# Patient Record
Sex: MALE | Race: WHITE | NOT HISPANIC OR LATINO | Employment: FULL TIME | URBAN - NONMETROPOLITAN AREA
[De-identification: names, ages, dates, MRNs, and addresses within clinical notes are randomized per-mention and may not be internally consistent; named-entity substitution may affect disease eponyms.]

---

## 2020-01-20 ENCOUNTER — HOSPITAL ENCOUNTER (EMERGENCY)
Facility: HOSPITAL | Age: 50
Discharge: LEFT BEFORE DISCHARGE | End: 2020-01-20

## 2020-01-20 VITALS
BODY MASS INDEX: 23.1 KG/M2 | WEIGHT: 180 LBS | TEMPERATURE: 98 F | OXYGEN SATURATION: 100 % | RESPIRATION RATE: 18 BRPM | SYSTOLIC BLOOD PRESSURE: 112 MMHG | HEART RATE: 77 BPM | DIASTOLIC BLOOD PRESSURE: 45 MMHG | HEIGHT: 74 IN

## 2020-01-20 LAB
FLUAV AG NPH QL: NEGATIVE
FLUBV AG NPH QL IA: NEGATIVE

## 2020-01-20 PROCEDURE — 87804 INFLUENZA ASSAY W/OPTIC: CPT | Performed by: FAMILY MEDICINE

## 2020-01-21 NOTE — ED NOTES
Pt called to be roomed at this time, pt unable to be located in ED lobby or outside of ED waiting area.     Genna Arora RN  01/20/20 1957

## 2022-03-24 ENCOUNTER — TRANSCRIBE ORDERS (OUTPATIENT)
Dept: ADMINISTRATIVE | Facility: HOSPITAL | Age: 52
End: 2022-03-24

## 2022-03-24 DIAGNOSIS — R63.4 LOSS OF WEIGHT: Primary | ICD-10-CM

## 2022-04-01 ENCOUNTER — HOSPITAL ENCOUNTER (OUTPATIENT)
Dept: PET IMAGING | Facility: HOSPITAL | Age: 52
Discharge: HOME | End: 2022-04-01
Admitting: INTERNAL MEDICINE

## 2022-04-01 DIAGNOSIS — R63.4 LOSS OF WEIGHT: ICD-10-CM

## 2022-04-01 PROCEDURE — A9552 F18 FDG: HCPCS | Performed by: INTERNAL MEDICINE

## 2022-04-01 PROCEDURE — 78815 PET IMAGE W/CT SKULL-THIGH: CPT | Performed by: RADIOLOGY

## 2022-04-01 PROCEDURE — 0 FLUDEOXYGLUCOSE F18 SOLUTION: Performed by: INTERNAL MEDICINE

## 2022-04-01 PROCEDURE — 78815 PET IMAGE W/CT SKULL-THIGH: CPT

## 2022-04-01 RX ADMIN — FLUDEOXYGLUCOSE F18 1 DOSE: 300 INJECTION INTRAVENOUS at 13:20

## 2025-03-24 ENCOUNTER — OFFICE VISIT (OUTPATIENT)
Dept: FAMILY MEDICINE CLINIC | Facility: CLINIC | Age: 55
End: 2025-03-24
Payer: COMMERCIAL

## 2025-03-24 VITALS
DIASTOLIC BLOOD PRESSURE: 80 MMHG | HEART RATE: 87 BPM | OXYGEN SATURATION: 98 % | SYSTOLIC BLOOD PRESSURE: 124 MMHG | HEIGHT: 74 IN | BODY MASS INDEX: 22.15 KG/M2 | WEIGHT: 172.6 LBS | TEMPERATURE: 98.7 F

## 2025-03-24 DIAGNOSIS — F17.210 CIGARETTE NICOTINE DEPENDENCE WITHOUT COMPLICATION: ICD-10-CM

## 2025-03-24 DIAGNOSIS — J44.1 COPD WITH EXACERBATION: Primary | ICD-10-CM

## 2025-03-24 PROCEDURE — 99204 OFFICE O/P NEW MOD 45 MIN: CPT | Performed by: FAMILY MEDICINE

## 2025-03-24 PROCEDURE — 1126F AMNT PAIN NOTED NONE PRSNT: CPT | Performed by: FAMILY MEDICINE

## 2025-03-24 PROCEDURE — 1160F RVW MEDS BY RX/DR IN RCRD: CPT | Performed by: FAMILY MEDICINE

## 2025-03-24 PROCEDURE — 1159F MED LIST DOCD IN RCRD: CPT | Performed by: FAMILY MEDICINE

## 2025-03-24 RX ORDER — FLUTICASONE FUROATE AND VILANTEROL 100; 25 UG/1; UG/1
1 POWDER RESPIRATORY (INHALATION)
COMMUNITY
End: 2025-03-24 | Stop reason: SDUPTHER

## 2025-03-24 RX ORDER — FLUTICASONE FUROATE AND VILANTEROL 100; 25 UG/1; UG/1
1 POWDER RESPIRATORY (INHALATION)
Qty: 60 EACH | Refills: 2 | Status: SHIPPED | OUTPATIENT
Start: 2025-03-24

## 2025-03-24 RX ORDER — DOXYCYCLINE 100 MG/1
100 CAPSULE ORAL 2 TIMES DAILY
Qty: 14 CAPSULE | Refills: 0 | Status: SHIPPED | OUTPATIENT
Start: 2025-03-24

## 2025-03-24 RX ORDER — BUPROPION HYDROCHLORIDE 150 MG/1
150 TABLET ORAL DAILY
Qty: 30 TABLET | Refills: 2 | Status: SHIPPED | OUTPATIENT
Start: 2025-03-24

## 2025-03-24 RX ORDER — PREDNISONE 20 MG/1
20 TABLET ORAL DAILY
Qty: 5 TABLET | Refills: 0 | Status: SHIPPED | OUTPATIENT
Start: 2025-03-24

## 2025-03-24 NOTE — PROGRESS NOTES
Chief Complaint  Establish Care and Shortness of Breath    Subjective          Braden Rodriguez presents to [facility]  Shortness of Breath      History of Present Illness  The patient is a 55-year-old male who presents to establish care.    He has a history of chronic obstructive pulmonary disease (COPD), previously managed by Dr. Cochran. He was prescribed Symbicort, which proved ineffective, and subsequently Trelegy, which he found beneficial. He uses the inhaler as needed, approximately twice weekly. He reports experiencing wheezing when in a supine position, a symptom he had not previously encountered. He has not had any consultations with a pulmonologist. His last laboratory tests were conducted three years ago. He is seeking a refill of his Breo prescription. He also expresses interest in smoking cessation strategies. He has reduced his smoking to one pack per day but does not anticipate further reduction. Approximately ten years ago, he was prescribed Wellbutrin by Dr. Cochran, which enabled him to quit smoking for seven years. However, he resumed smoking due to personal circumstances. He underwent lung cancer screening six years ago, which revealed fluid accumulation in his lungs, necessitating drainage of 2.5 bags. He has been smoking since 1986, with a peak consumption of 2.5 to 3 packs per day, now reduced to one pack per day. He recalls a severe coughing episode in 2013, during which his oxygen saturation dropped to 45 percent. He was advised to rest at home and seek immediate medical attention if his condition worsened. He was prescribed Tessalon for cough management at an urgent care facility, but it did not alleviate his symptoms.    He had a colonoscopy within the last year at [facility].    SOCIAL HISTORY  The patient admits to smoking since 1986 and is currently down to a pack a day.    MEDICATIONS  Current: Trelegy, Breo  Past: Symbicort, Wellbutrin, Tessalon    Review of  "Systems   Respiratory:  Positive for shortness of breath.          Objective   Vital Signs:   /80 (BP Location: Right arm, Patient Position: Sitting, Cuff Size: Large Adult)   Pulse 87   Temp 98.7 °F (37.1 °C) (Temporal)   Ht 188 cm (74\")   Wt 78.3 kg (172 lb 9.6 oz)   SpO2 98%   BMI 22.16 kg/m²     Physical Exam  Lungs were auscultated.    Physical Exam  Constitutional:       General: He is not in acute distress.     Appearance: Normal appearance. He is well-developed and well-groomed. He is not ill-appearing, toxic-appearing or diaphoretic.   HENT:      Head: Normocephalic.      Right Ear: Tympanic membrane, ear canal and external ear normal.      Left Ear: Tympanic membrane, ear canal and external ear normal.      Nose: Nose normal. No congestion or rhinorrhea.      Mouth/Throat:      Mouth: Mucous membranes are moist.      Pharynx: Oropharynx is clear. No oropharyngeal exudate or posterior oropharyngeal erythema.   Eyes:      General: Lids are normal.         Right eye: No discharge.         Left eye: No discharge.      Extraocular Movements: Extraocular movements intact.      Pupils: Pupils are equal, round, and reactive to light.   Neck:      Vascular: No carotid bruit.   Cardiovascular:      Rate and Rhythm: Normal rate and regular rhythm.      Pulses: Normal pulses.      Heart sounds: Normal heart sounds. No murmur heard.     No friction rub. No gallop.   Pulmonary:      Effort: Pulmonary effort is normal. No respiratory distress.      Breath sounds: No stridor. Wheezing present. No rhonchi or rales.   Chest:      Chest wall: No tenderness.   Abdominal:      General: Bowel sounds are normal. There is no distension.      Palpations: Abdomen is soft. There is no mass.      Tenderness: There is no abdominal tenderness. There is no right CVA tenderness, left CVA tenderness, guarding or rebound.      Hernia: No hernia is present.   Musculoskeletal:         General: No swelling or tenderness. Normal " range of motion.      Cervical back: Normal range of motion and neck supple. No rigidity or tenderness.      Right lower leg: No edema.      Left lower leg: No edema.   Lymphadenopathy:      Cervical: No cervical adenopathy.   Skin:     General: Skin is warm.      Capillary Refill: Capillary refill takes less than 2 seconds.      Coloration: Skin is not jaundiced.      Findings: No bruising, erythema or rash.   Neurological:      General: No focal deficit present.      Mental Status: He is alert and oriented to person, place, and time.      Motor: Motor function is intact. No weakness.      Coordination: Coordination is intact.      Gait: Gait is intact. Gait normal.   Psychiatric:         Attention and Perception: Attention normal.         Mood and Affect: Mood normal.         Speech: Speech normal.         Behavior: Behavior normal.         Cognition and Memory: Cognition normal.         Judgment: Judgment normal.        Result Review :          Results                Assessment and Plan    Diagnoses and all orders for this visit:    1. COPD with exacerbation (Primary)  -     CBC Auto Differential; Future  -     Comprehensive Metabolic Panel; Future  -     Hemoglobin A1c; Future  -     Lipid Panel; Future  -     T4, Free; Future  -     TSH; Future  -     doxycycline (VIBRAMYCIN) 100 MG capsule; Take 1 capsule by mouth 2 (Two) Times a Day.  Dispense: 14 capsule; Refill: 0  -     predniSONE (DELTASONE) 20 MG tablet; Take 1 tablet by mouth Daily.  Dispense: 5 tablet; Refill: 0  -     Fluticasone Furoate-Vilanterol (Breo Ellipta) 100-25 MCG/ACT aerosol powder ; Inhale 1 puff Daily.  Dispense: 60 each; Refill: 2  -     XR Chest PA & Lateral (In Office)    2. Cigarette nicotine dependence without complication  -     CT Chest Low Dose Wo; Future  -     buPROPion XL (Wellbutrin XL) 150 MG 24 hr tablet; Take 1 tablet by mouth Daily.  Dispense: 30 tablet; Refill: 2      Assessment & Plan  1. Chronic Obstructive Pulmonary  Disease (COPD).  He reports that Trelegy has been effective in managing his COPD symptoms. He experiences wheezing when lying down and has a history of fluid on his lungs. A prescription for doxycycline and prednisone has been issued to manage his current symptoms. A chest x-ray will be conducted today to assess his lung condition. A refill for his Breo inhaler has been provided and sent to [facility] pharmacy. If symptoms persist, a referral to pulmonology will be considered.    2. Smoking Cessation.  He has expressed interest in quitting smoking and has previously used Wellbutrin successfully to quit for seven years. He is currently down to a pack a day but finds it challenging to reduce further. Discussed the importance of smoking cessation and potential strategies, including the use of medications like Wellbutrin again if needed.    3. Health Maintenance.  He had a colonoscopy within the last year at [facility]. Efforts will be made to obtain the results of his previous colonoscopy.    Follow-up  The patient will follow up in 2 weeks.    PROCEDURE  The patient underwent colonoscopy within the last year at [facility].    Patient's Body mass index is 22.16 kg/m². indicating that he is within normal range (BMI 18.5-24.9). No BMI management plan needed..    Follow Up   Return in about 2 weeks (around 4/7/2025), or xray today.  Patient was given instructions and counseling regarding his condition or for health maintenance advice. Please see specific information pulled into the AVS if appropriate.     This document has been electronically signed by PENELOPE Howard  March 24, 2025 16:05 EDT     Patient or patient representative verbalized consent for the use of Ambient Listening during the visit with  PENELOPE Howard for chart documentation. 3/24/2025  16:05 EDT

## 2025-03-26 ENCOUNTER — RESULTS FOLLOW-UP (OUTPATIENT)
Dept: FAMILY MEDICINE CLINIC | Facility: CLINIC | Age: 55
End: 2025-03-26
Payer: COMMERCIAL

## 2025-04-03 ENCOUNTER — TELEPHONE (OUTPATIENT)
Dept: FAMILY MEDICINE CLINIC | Facility: CLINIC | Age: 55
End: 2025-04-03
Payer: COMMERCIAL

## 2025-04-03 NOTE — TELEPHONE ENCOUNTER
04/03/2025 NINO Bess submitted and approved from 04/03/2025 to 04/03/2026. [facility] [phone] Bao Wood notified.

## 2025-04-08 ENCOUNTER — RESULTS FOLLOW-UP (OUTPATIENT)
Dept: FAMILY MEDICINE CLINIC | Facility: CLINIC | Age: 55
End: 2025-04-08

## 2025-04-08 ENCOUNTER — LAB (OUTPATIENT)
Dept: FAMILY MEDICINE CLINIC | Facility: CLINIC | Age: 55
End: 2025-04-08
Payer: COMMERCIAL

## 2025-04-08 ENCOUNTER — OFFICE VISIT (OUTPATIENT)
Dept: FAMILY MEDICINE CLINIC | Facility: CLINIC | Age: 55
End: 2025-04-08
Payer: COMMERCIAL

## 2025-04-08 VITALS
DIASTOLIC BLOOD PRESSURE: 80 MMHG | BODY MASS INDEX: 21.87 KG/M2 | HEART RATE: 57 BPM | HEIGHT: 74 IN | OXYGEN SATURATION: 95 % | WEIGHT: 170.4 LBS | SYSTOLIC BLOOD PRESSURE: 124 MMHG | TEMPERATURE: 97.7 F

## 2025-04-08 DIAGNOSIS — J44.1 COPD WITH EXACERBATION: ICD-10-CM

## 2025-04-08 DIAGNOSIS — B07.0 PLANTAR WART: ICD-10-CM

## 2025-04-08 DIAGNOSIS — J44.1 COPD WITH EXACERBATION: Primary | ICD-10-CM

## 2025-04-08 DIAGNOSIS — Z12.11 SCREEN FOR COLON CANCER: ICD-10-CM

## 2025-04-08 LAB
ALBUMIN SERPL-MCNC: 4.1 G/DL (ref 3.5–5.2)
ALBUMIN/GLOB SERPL: 1.4 G/DL
ALP SERPL-CCNC: 124 U/L (ref 39–117)
ALT SERPL W P-5'-P-CCNC: 16 U/L (ref 1–41)
ANION GAP SERPL CALCULATED.3IONS-SCNC: 8.5 MMOL/L (ref 5–15)
AST SERPL-CCNC: 21 U/L (ref 1–40)
BASOPHILS # BLD AUTO: 0.07 10*3/MM3 (ref 0–0.2)
BASOPHILS NFR BLD AUTO: 0.7 % (ref 0–1.5)
BILIRUB SERPL-MCNC: 0.6 MG/DL (ref 0–1.2)
BUN SERPL-MCNC: 8 MG/DL (ref 6–20)
BUN/CREAT SERPL: 10.8 (ref 7–25)
CALCIUM SPEC-SCNC: 9 MG/DL (ref 8.6–10.5)
CHLORIDE SERPL-SCNC: 105 MMOL/L (ref 98–107)
CHOLEST SERPL-MCNC: 160 MG/DL (ref 0–200)
CO2 SERPL-SCNC: 26.5 MMOL/L (ref 22–29)
CREAT SERPL-MCNC: 0.74 MG/DL (ref 0.76–1.27)
DEPRECATED RDW RBC AUTO: 40.4 FL (ref 37–54)
EGFRCR SERPLBLD CKD-EPI 2021: 106.3 ML/MIN/1.73
EOSINOPHIL # BLD AUTO: 0.29 10*3/MM3 (ref 0–0.4)
EOSINOPHIL NFR BLD AUTO: 2.8 % (ref 0.3–6.2)
ERYTHROCYTE [DISTWIDTH] IN BLOOD BY AUTOMATED COUNT: 12.6 % (ref 12.3–15.4)
GLOBULIN UR ELPH-MCNC: 2.9 GM/DL
GLUCOSE SERPL-MCNC: 94 MG/DL (ref 65–99)
HBA1C MFR BLD: 5.4 % (ref 4.8–5.6)
HCT VFR BLD AUTO: 50.4 % (ref 37.5–51)
HDLC SERPL-MCNC: 38 MG/DL (ref 40–60)
HGB BLD-MCNC: 16.3 G/DL (ref 13–17.7)
IMM GRANULOCYTES # BLD AUTO: 0.04 10*3/MM3 (ref 0–0.05)
IMM GRANULOCYTES NFR BLD AUTO: 0.4 % (ref 0–0.5)
LDLC SERPL CALC-MCNC: 105 MG/DL (ref 0–100)
LDLC/HDLC SERPL: 2.74 {RATIO}
LYMPHOCYTES # BLD AUTO: 1.46 10*3/MM3 (ref 0.7–3.1)
LYMPHOCYTES NFR BLD AUTO: 14.3 % (ref 19.6–45.3)
MCH RBC QN AUTO: 28.7 PG (ref 26.6–33)
MCHC RBC AUTO-ENTMCNC: 32.3 G/DL (ref 31.5–35.7)
MCV RBC AUTO: 88.7 FL (ref 79–97)
MONOCYTES # BLD AUTO: 0.81 10*3/MM3 (ref 0.1–0.9)
MONOCYTES NFR BLD AUTO: 7.9 % (ref 5–12)
NEUTROPHILS NFR BLD AUTO: 7.57 10*3/MM3 (ref 1.7–7)
NEUTROPHILS NFR BLD AUTO: 73.9 % (ref 42.7–76)
NRBC BLD AUTO-RTO: 0 /100 WBC (ref 0–0.2)
PLATELET # BLD AUTO: 240 10*3/MM3 (ref 140–450)
PMV BLD AUTO: 9.8 FL (ref 6–12)
POTASSIUM SERPL-SCNC: 3.7 MMOL/L (ref 3.5–5.2)
PROT SERPL-MCNC: 7 G/DL (ref 6–8.5)
RBC # BLD AUTO: 5.68 10*6/MM3 (ref 4.14–5.8)
SODIUM SERPL-SCNC: 140 MMOL/L (ref 136–145)
T4 FREE SERPL-MCNC: 1.3 NG/DL (ref 0.92–1.68)
TRIGL SERPL-MCNC: 90 MG/DL (ref 0–150)
TSH SERPL DL<=0.05 MIU/L-ACNC: 1.45 UIU/ML (ref 0.27–4.2)
VLDLC SERPL-MCNC: 17 MG/DL (ref 5–40)
WBC NRBC COR # BLD AUTO: 10.24 10*3/MM3 (ref 3.4–10.8)

## 2025-04-08 PROCEDURE — 36415 COLL VENOUS BLD VENIPUNCTURE: CPT

## 2025-04-08 PROCEDURE — 80050 GENERAL HEALTH PANEL: CPT | Performed by: FAMILY MEDICINE

## 2025-04-08 PROCEDURE — 80061 LIPID PANEL: CPT | Performed by: FAMILY MEDICINE

## 2025-04-08 PROCEDURE — 84439 ASSAY OF FREE THYROXINE: CPT | Performed by: FAMILY MEDICINE

## 2025-04-08 PROCEDURE — 83036 HEMOGLOBIN GLYCOSYLATED A1C: CPT | Performed by: FAMILY MEDICINE

## 2025-04-08 RX ORDER — NICOTINE 21-14-7MG
1 KIT TRANSDERMAL DAILY
Qty: 70 EACH | Refills: 0 | Status: SHIPPED | OUTPATIENT
Start: 2025-04-08

## 2025-04-08 RX ORDER — TRAZODONE HYDROCHLORIDE 50 MG/1
50 TABLET ORAL NIGHTLY
Qty: 90 TABLET | Refills: 0 | Status: SHIPPED | OUTPATIENT
Start: 2025-04-08

## 2025-04-08 RX ORDER — FLUTICASONE FUROATE, UMECLIDINIUM BROMIDE AND VILANTEROL TRIFENATATE 200; 62.5; 25 UG/1; UG/1; UG/1
1 POWDER RESPIRATORY (INHALATION)
Qty: 60 EACH | Refills: 2 | Status: SHIPPED | OUTPATIENT
Start: 2025-04-08

## 2025-04-08 NOTE — PROGRESS NOTES
Chief Complaint  COPD    Subjective          Braden Rodriguez presents to [facility]  COPD      History of Present Illness  The patient is a 55-year-old male who presents for follow-up on inhalers, nicotine dependence, sleep disturbance, and foot corn.    He reports a slight improvement in his cough, which has been a persistent issue for several years. He has not yet undergone the scheduled CT scan. He is currently using Trelegy 200 mcg inhaler.    He expresses interest in nicotine patches as an aid to quit smoking. His smoking habit has reduced to approximately three-quarters of a pack per day.    He has been experiencing sleep disturbances, often not falling asleep until 6:00 or 7:00 AM after returning home from work at 1:00 AM. He has not tried trazodone or any other sleep aids due to concerns about potential addiction. He has attempted to manage this with ZzzQuil but reports no improvement and is hesitant to increase the dosage. He recalls a previous experience with a different sleep aid, which initially provided 8 to 9 hours of sleep but gradually became less effective, reducing his sleep duration to 3 hours.    He suspects the presence of a corn on his foot, which he attempts to manage by peeling off the top layer after showers. However, the corn reappears the following day. He experiences pain in his feet while lying in bed, which is exacerbated by a pre-existing limp in his left leg. He also reports a high arch in his foot. He has previously used Dr. Addison's insoles and trims his toenails every 3 to 4 days. He has a history of foot problems since childhood and is unable to walk barefoot.    He has not had a colonoscopy in over 10 years. He prefers to do Cologuard test at home.    SOCIAL HISTORY  The patient is down to about three-quarters of a pack of cigarettes a day.    FAMILY HISTORY  The patient reports that basically everyone on his mother's side had  of some type of  "cancer. His father's side has had some kind of heart disease.    MEDICATIONS  Current: Trelegy inhaler, ZzzQuil    Review of Systems      Objective   Vital Signs:   /80 (BP Location: Right arm, Patient Position: Sitting, Cuff Size: Large Adult)   Pulse 57   Temp 97.7 °F (36.5 °C) (Temporal)   Ht 188 cm (74\")   Wt 77.3 kg (170 lb 6.4 oz)   SpO2 95%   BMI 21.88 kg/m²     Physical Exam  Lungs sound coarse, but improved. No wheezing.    Physical Exam  Constitutional:       General: He is not in acute distress.     Appearance: Normal appearance. He is well-developed and well-groomed. He is not ill-appearing, toxic-appearing or diaphoretic.   HENT:      Head: Normocephalic.      Nose: Nose normal. No congestion or rhinorrhea.      Mouth/Throat:      Mouth: Mucous membranes are moist.      Pharynx: Oropharynx is clear. No oropharyngeal exudate or posterior oropharyngeal erythema.   Eyes:      General: Lids are normal.         Right eye: No discharge.         Left eye: No discharge.      Extraocular Movements: Extraocular movements intact.      Pupils: Pupils are equal, round, and reactive to light.   Neck:      Vascular: No carotid bruit.   Cardiovascular:      Rate and Rhythm: Normal rate and regular rhythm.      Pulses: Normal pulses.      Heart sounds: Normal heart sounds. No murmur heard.     No friction rub. No gallop.   Pulmonary:      Effort: Pulmonary effort is normal. No respiratory distress.      Breath sounds: Normal breath sounds. No stridor. No wheezing, rhonchi or rales.   Chest:      Chest wall: No tenderness.   Abdominal:      General: Bowel sounds are normal. There is no distension.      Palpations: Abdomen is soft. There is no mass.      Tenderness: There is no abdominal tenderness. There is no right CVA tenderness, left CVA tenderness, guarding or rebound.      Hernia: No hernia is present.   Musculoskeletal:         General: No swelling or tenderness. Normal range of motion.      Cervical " back: Normal range of motion and neck supple. No rigidity or tenderness.      Right lower leg: No edema.      Left lower leg: No edema.   Lymphadenopathy:      Cervical: No cervical adenopathy.   Skin:     General: Skin is warm.      Capillary Refill: Capillary refill takes less than 2 seconds.      Coloration: Skin is not jaundiced.      Findings: No bruising, erythema or rash.   Neurological:      General: No focal deficit present.      Mental Status: He is alert and oriented to person, place, and time.      Motor: Motor function is intact. No weakness.      Coordination: Coordination is intact.      Gait: Gait is intact. Gait normal.   Psychiatric:         Attention and Perception: Attention normal.         Mood and Affect: Mood normal.         Speech: Speech normal.         Behavior: Behavior normal.         Cognition and Memory: Cognition normal.         Judgment: Judgment normal.        Result Review :          Results  Imaging  CT scan showed a small amount of fluid and coarse interstitial markings throughout the lungs.              Assessment and Plan    Diagnoses and all orders for this visit:    1. COPD with exacerbation (Primary)  -     Ambulatory Referral to Pulmonology  -     XR Chest PA & Lateral; Future    2. Screen for colon cancer  -     Cologuard - Stool, Per Rectum; Future    3. Plantar wart  -     Ambulatory Referral to Podiatry    Other orders  -     Nicotine 21-14-7 MG/24HR kit; Place 1 patch on the skin as directed by provider Daily.  Dispense: 70 each; Refill: 0  -     Fluticasone-Umeclidin-Vilant (Trelegy Ellipta) 200-62.5-25 MCG/ACT inhaler; Inhale 1 puff Daily.  Dispense: 60 each; Refill: 2  -     traZODone (DESYREL) 50 MG tablet; Take 1 tablet by mouth Every Night.  Dispense: 90 tablet; Refill: 0      Assessment & Plan  1. Chronic Obstructive Pulmonary Disease (COPD).  His cough has shown improvement, but it remains present. The coarse interstitial markings throughout his lungs are  indicative of COPD. A chest x-ray will be ordered to monitor the progression of his condition. Additionally, a CT scan will be scheduled. A referral to pulmonology will be made for further evaluation and management.    2. Nicotine Dependence.  He has reduced his smoking to about three-quarters of a pack per day. A prescription for nicotine patches will be provided, starting with the strongest dose (21 mg) for 6 weeks, then tapering down to 14 mg for 2 weeks, and finally to 7 mg for 2 weeks. The prescription will be sent to [facility] pharmacy.    3. Sleep Disturbance.  He works the second shift and has difficulty falling asleep until 6-7 AM. Trazodone will be prescribed to be taken 1-2 hours before bedtime. He is advised to monitor its effectiveness and report any issues.    4. Foot [place].  He reports recurrent painful corns on his feet, which he attempts to manage by peeling. A referral to a podiatrist will be made for further evaluation and treatment.    5. Health Maintenance.  A Cologuard test will be ordered and mailed to his home for colon cancer screening, as it has been over 10 years since his last colonoscopy.    Patient's Body mass index is 21.88 kg/m². indicating that he is within normal range (BMI 18.5-24.9). No BMI management plan needed..    Follow Up   Return in about 3 months (around 7/8/2025), or xray today, labs today.  Patient was given instructions and counseling regarding his condition or for health maintenance advice. Please see specific information pulled into the AVS if appropriate.     This document has been electronically signed by PENELOPE Howard  April 8, 2025 13:15 EDT     Patient or patient representative verbalized consent for the use of Ambient Listening during the visit with  PENELOPE Howard for chart documentation. 4/8/2025  13:16 EDT

## 2025-04-09 ENCOUNTER — TELEPHONE (OUTPATIENT)
Dept: FAMILY MEDICINE CLINIC | Facility: CLINIC | Age: 55
End: 2025-04-09
Payer: COMMERCIAL

## 2025-04-09 NOTE — TELEPHONE ENCOUNTER
04/09/2025 NINO Bess submitted and approved from 04/09/2025 to 04/09/2026. [facility] [phone] Bao Wood notified.

## 2025-04-09 NOTE — TELEPHONE ENCOUNTER
Caller: Braden Rodriguez    Relationship to patient: Self      Best call back number:     Provider: [facility]    Medication PA needed: TRELEGY    Reason for call/Prior Auth: REQUIRES PRIOR AUTH     BRADEN HAS NONE LEFT

## 2025-04-09 NOTE — TELEPHONE ENCOUNTER
04/09/2025 NINO Bess submitted and approved from 04/09/2025 to 04/09/2026. [facility] [phone]  Bao Wood notified.

## 2025-04-14 ENCOUNTER — HOSPITAL ENCOUNTER (OUTPATIENT)
Facility: HOSPITAL | Age: 55
Discharge: HOME | End: 2025-04-14
Admitting: FAMILY MEDICINE
Payer: COMMERCIAL

## 2025-04-14 DIAGNOSIS — F17.210 CIGARETTE NICOTINE DEPENDENCE WITHOUT COMPLICATION: ICD-10-CM

## 2025-04-14 PROCEDURE — 71271 CT THORAX LUNG CANCER SCR C-: CPT | Performed by: RADIOLOGY

## 2025-04-14 PROCEDURE — 71271 CT THORAX LUNG CANCER SCR C-: CPT

## 2025-05-08 ENCOUNTER — OFFICE VISIT (OUTPATIENT)
Dept: PULMONOLOGY | Facility: CLINIC | Age: 55
End: 2025-05-08
Payer: COMMERCIAL

## 2025-05-08 VITALS
SYSTOLIC BLOOD PRESSURE: 124 MMHG | DIASTOLIC BLOOD PRESSURE: 72 MMHG | HEIGHT: 74 IN | HEART RATE: 76 BPM | OXYGEN SATURATION: 93 % | WEIGHT: 175 LBS | BODY MASS INDEX: 22.46 KG/M2 | TEMPERATURE: 96.7 F

## 2025-05-08 DIAGNOSIS — J44.9 CHRONIC OBSTRUCTIVE PULMONARY DISEASE, UNSPECIFIED COPD TYPE: Primary | ICD-10-CM

## 2025-05-08 PROCEDURE — 99214 OFFICE O/P EST MOD 30 MIN: CPT | Performed by: NURSE PRACTITIONER

## 2025-05-08 PROCEDURE — 1160F RVW MEDS BY RX/DR IN RCRD: CPT | Performed by: NURSE PRACTITIONER

## 2025-05-08 PROCEDURE — 1159F MED LIST DOCD IN RCRD: CPT | Performed by: NURSE PRACTITIONER

## 2025-05-08 RX ORDER — ALBUTEROL SULFATE AND BUDESONIDE 90; 80 UG/1; UG/1
2 AEROSOL, METERED RESPIRATORY (INHALATION) EVERY 4 HOURS PRN
Qty: 10.7 G | Refills: 5 | Status: SHIPPED | OUTPATIENT
Start: 2025-05-08

## 2025-05-08 RX ORDER — IPRATROPIUM BROMIDE AND ALBUTEROL SULFATE 2.5; .5 MG/3ML; MG/3ML
3 SOLUTION RESPIRATORY (INHALATION) 4 TIMES DAILY PRN
Qty: 360 ML | Refills: 3 | Status: SHIPPED | OUTPATIENT
Start: 2025-05-08

## 2025-05-08 NOTE — PROGRESS NOTES
"Chief Complaint  COPD    Subjective          Braden Rodriguez presents to [facility] for   History of Present Illness    Mr. Feliciano is a 55 year old male with a medical history significant for COPD.      He presents today for evaluation of COPD.  He reports that he has shortness of breath that is worse at night and with exertion.  He also reports a dry cough.  He states that he occasionally has sputum production.  He is a current smoker of about 1/2 ppd.  He is also using Trelegy once daily and reports that this helps a lot.            Objective   Vital Signs:   /72   Pulse 76   Temp 96.7 °F (35.9 °C)   Ht 188 cm (74.02\")   Wt 79.4 kg (175 lb)   SpO2 93%   BMI 22.46 kg/m²         Physical Exam    GENERAL APPEARANCE: Well developed, well nourished, alert and cooperative, and appears to be in no acute distress.    HEAD: normocephalic. Atraumatic.    EYES: PERRL, EOMI. Vision is grossly intact.    THROAT: Oral cavity and pharynx normal. No inflammation, swelling, exudate, or lesions.     NECK: Neck supple.  No thyromegaly.    CARDIAC: Normal S1 and S2. No S3, S4 or murmurs. Rhythm is regular.     RESPIRATORY:Bilateral air entry positive. No wheezing, crackles or rhonchi noted.    GI: Positive bowel sounds. Soft, nondistended, nontender.     MUSCULOSKELETAL: No significant deformity or joint abnormality. No edema. Peripheral pulses intact. No varicosities.    NEUROLOGICAL: Strength and sensation symmetric and intact throughout.     PSYCHIATRIC: The mental examination revealed the patient was oriented to person, place, and time.       Estimated body mass index is 22.46 kg/m² as calculated from the following:    Height as of this encounter: 188 cm (74.02\").    Weight as of this encounter: 79.4 kg (175 lb).        Result Review :   The following data was reviewed by: PENELOPE Roldan on 05/08/2025:  Common labs          4/8/2025    11:40   Common Labs " "  Glucose 94    BUN 8    Creatinine 0.74    Sodium 140    Potassium 3.7    Chloride 105    Calcium 9.0    Albumin 4.1    Total Bilirubin 0.6    Alkaline Phosphatase 124    AST (SGOT) 21    ALT (SGPT) 16    WBC 10.24    Hemoglobin 16.3    Hematocrit 50.4    Platelets 240    Total Cholesterol 160    Triglycerides 90    HDL Cholesterol 38    LDL Cholesterol  105    Hemoglobin A1C 5.40           PFT:NA    Low dose lung cancer screenin/15/25    FINDINGS:    Limitations:  Please note that reported measurements are made  manually, as computer generated (AI) measurements can over measure  lesions. Additionally, lesions identified by AI may have been present  presently, significant nodules will be discussed in the report and the  visual depictions of lesions provided by AI are for general reference  only.    Lungs and pleural spaces:  Tiny right pleural effusions.   Centrilobular emphysema noted.  Subpleural right upper lobe pulmonary  nodule measures 5 mm.  Rounded atelectasis noted at the right lung base.   No pneumothorax.    Heart:  Unremarkable as visualized.   No significant pericardial  effusion.  Moderate coronary artery calcifications.    Bones/joints:  Unremarkable as visualized.  No acute fracture.  No  dislocation.    Soft tissues:  Unremarkable as visualized.    Vasculature:  Unremarkable as visualized.  No thoracic aortic  aneurysm.    Lymph nodes:  Unremarkable as visualized.  No enlarged lymph nodes.     IMPRESSION:  1.  Tiny right pleural effusions.  2.  Centrilobular emphysema noted.  3.  Subpleural right upper lobe pulmonary nodule measures 5 mm.  4.  Rounded atelectasis noted at the right lung base.     ASSESSMENT:    Lung RADS 2     This report was finalized on 4/15/2025 7:56 AM by Dr. Jose Boyer MD.    Previous chest imaging:NA    Alpha-1 antitrypsin screening:NA    STOP-Bang Score:   NA  Axton Sleepiness Scale:   NA      ABG:    pH No results found for: \"PHART\"   pO2 No results found for: " "\"PO2ART\"   pCO2 No results found for: \"PQS1DLZ\"   HCO3 No results found for: \"ZNV2RXJ\"                      Assessment and Plan    Problem List Items Addressed This Visit    None  Visit Diagnoses         Chronic obstructive pulmonary disease, unspecified COPD type    -  Primary    Relevant Medications    Albuterol-Budesonide (Airsupra) 90-80 MCG/ACT aerosol    ipratropium-albuterol (DUO-NEB) 0.5-2.5 mg/3 ml nebulizer    Other Relevant Orders    Complete PFT - Pre & Post Bronchodilator    Overnight Sleep Oximetry Study            Braden Rodriguez  reports that he has been smoking cigarettes. He started smoking about 39 years ago. He has a 39.4 pack-year smoking history. He has been exposed to tobacco smoke. He has never used smokeless tobacco. I have educated him on the risk of diseases from using tobacco products such as cancer, COPD, and heart disease.     I advised him to quit and he is trying to quit but has not set a quit date.    Ordered PFT to assess lung function.  Ordered overnight pulse oximetry study.    Continue Trelegy once daily.  Started him on airsupra as needed.  Also started him on neb treatments as needed.  Provided him with a nebulizer machine.    LDCT showed a subpleural nodule measuring 5 mm. We will plan to repeat CT Chest in one year.     Follow Up   Return in about 2 months (around 7/8/2025).  Patient was given instructions and counseling regarding his condition or for health maintenance advice. Please see specific information pulled into the AVS if appropriate.        "